# Patient Record
Sex: MALE | NOT HISPANIC OR LATINO | URBAN - METROPOLITAN AREA
[De-identification: names, ages, dates, MRNs, and addresses within clinical notes are randomized per-mention and may not be internally consistent; named-entity substitution may affect disease eponyms.]

---
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
## 2025-06-07 ENCOUNTER — EMERGENCY (EMERGENCY)
Facility: HOSPITAL | Age: 20
LOS: 1 days | End: 2025-06-07
Attending: EMERGENCY MEDICINE | Admitting: EMERGENCY MEDICINE
Payer: COMMERCIAL

## 2025-06-07 VITALS
HEIGHT: 67 IN | RESPIRATION RATE: 18 BRPM | HEART RATE: 83 BPM | OXYGEN SATURATION: 100 % | SYSTOLIC BLOOD PRESSURE: 123 MMHG | TEMPERATURE: 98 F | DIASTOLIC BLOOD PRESSURE: 88 MMHG | WEIGHT: 130.07 LBS

## 2025-06-07 VITALS
HEART RATE: 78 BPM | SYSTOLIC BLOOD PRESSURE: 132 MMHG | OXYGEN SATURATION: 100 % | TEMPERATURE: 98 F | RESPIRATION RATE: 18 BRPM | DIASTOLIC BLOOD PRESSURE: 90 MMHG

## 2025-06-07 PROCEDURE — 99283 EMERGENCY DEPT VISIT LOW MDM: CPT

## 2025-06-07 PROCEDURE — 73562 X-RAY EXAM OF KNEE 3: CPT

## 2025-06-07 PROCEDURE — 73562 X-RAY EXAM OF KNEE 3: CPT | Mod: 26,RT

## 2025-06-07 NOTE — ED PROVIDER NOTE - NSFOLLOWUPINSTRUCTIONS_ED_ALL_ED_FT
Follow-up with orthopedist for reevaluation, ongoing care and treatment.  Rest, weightbearing as tolerated.  Use knee brace for support.  Take over-the-counter Tylenol or Motrin with food as directed for pain.  If having worsening of symptoms or other related symptoms, return to the ER immediately.    Acute Knee Pain, Adult  Many things can cause knee pain. Sometimes, knee pain is sudden (acute). It may be caused by damage, swelling, or irritation of the muscles and tissues that support your knee.    Pain may come from:  A fall.  An injury to the knee from twisting motions.  A hit to the knee.  Infection.  The pain often goes away on its own with time and rest. If the pain does not go away, tests may be done to find out what is causing the pain. These may include:  Imaging tests, such as an X-ray, MRI, CT scan, or ultrasound.  Joint aspiration. In this test, fluid is removed from the knee and checked.  Arthroscopy. In this test, a lighted tube is put in the knee and an image is shown on a screen.  A biopsy. In this test, a health care provider will remove a small piece of tissue for testing.  Follow these instructions at home:  If you have a knee sleeve or brace that can be taken off:    A person's knee with a brace on it.  Wear the knee sleeve or brace as told by your provider. Take it off only if your provider says that you can.  Check the skin around it every day. Tell your provider if you see problems.  Loosen the knee sleeve or brace if your toes tingle, are numb, or turn cold and blue.  Keep the knee sleeve or brace clean and dry.  Bathing    If the knee sleeve or brace is not waterproof:  Do not let it get wet.  Cover it when you take a bath or shower. Use a cover that does not let any water in.  Managing pain, stiffness, and swelling    Bag of ice on a towel on the skin.  If told, put ice on the area.  If you have a knee sleeve or brace that you can take off, remove it as told.  Put ice in a plastic bag.  Place a towel between your skin and the bag.  Leave the ice on for 20 minutes, 2–3 times a day.  If your skin turns bright red, take off the ice right away to prevent skin damage. The risk of damage is higher if you cannot feel pain, heat, or cold.  Move your toes often to reduce stiffness and swelling.  Raise the injured area above the level of your heart while you are sitting or lying down. Use a pillow to support your foot as needed.  If told, use an elastic bandage to put pressure (compression) on your injured knee. This may control swelling, give support, and help with discomfort.  Sleep with a pillow under your knee.  Activity    Rest your knee.  Do not do things that cause pain or make pain worse.  Do not stand or walk on your injured knee until you're told it's okay. Use crutches as told.  Avoid activities where both feet leave the ground at the same time and put stress on the joints. Avoid running, jumping rope, and doing jumping jacks.  Work with a physical therapist to make a safe exercise program if told. Physical therapy helps your knee move better and get stronger. Exercise as told.  General instructions    Take your medicines only as told by your provider.  If you are overweight, work with your provider and an expert in healthy eating, called a dietician, to set goals to lose weight. Being overweight can make your knee hurt more.  Do not smoke, vape, or use products with nicotine or tobacco in them. If you need help quitting, talk with your provider.  Return to normal activities when you are told. Ask what things are safe for you to do.  Watch for any changes in your symptoms.  Keep all follow-up visits. Your provider will check your healing and adjust treatments if needed.  Contact a health care provider if:  The knee pain does not stop.  The knee pain changes or gets worse.  You have a fever along with knee pain.  Your knee is red or feels warm when you touch it.  Your knee gives out or locks up.  Get help right away if:  Your knee swells and the swelling gets worse.  You cannot move your knee.  You have very bad knee pain that does not get better with medicine.

## 2025-06-07 NOTE — ED PROVIDER NOTE - PATIENT PORTAL LINK FT
You can access the FollowMyHealth Patient Portal offered by [facility] by registering at the following website: [email or web address]. By joining [employer]’s FollowMyHealth portal, you will also be able to view your health information using other applications (apps) compatible with our system.

## 2025-06-07 NOTE — ED PROVIDER NOTE - OBJECTIVE STATEMENT
19-year-old male with no past medical history presents with complaint of right knee pain x 1 week.  Patient states that he accidentally walked into a steel pole in his garage over a week ago and hit his medial right knee. States that he was initially fine for a few days and then started feeling pain in his right knee while walking and weight bearing. Denies taking any pain medications at home. Has been using a knee brace. Denies head trauma, fall, numbness, tingling, weakness, open wounds or other symptoms/injuries. Declined pain meds at this time.

## 2025-06-07 NOTE — ED ADULT TRIAGE NOTE - CCCP TRG CHIEF CMPLNT
Quality 47: Advance Care Plan: Advance Care Planning discussed and documented; advance care plan or surrogate decision maker documented in the medical record. Quality 226: Preventive Care And Screening: Tobacco Use: Screening And Cessation Intervention: Patient screened for tobacco use and is an ex/non-smoker Detail Level: Detailed Quality 134: Screening For Clinical Depression And Follow-Up Plan: The patient was screened for depression and the screen was negative and no follow up required Quality 130: Documentation Of Current Medications In The Medical Record: Current Medications Documented Quality 431: Preventive Care And Screening: Unhealthy Alcohol Use - Screening: Patient not identified as an unhealthy alcohol user when screened for unhealthy alcohol use using a systematic screening method pain, knee

## 2025-06-07 NOTE — ED ADULT NURSE NOTE - NS_NURSE_DISC_TEACHING_YN_ED_ALL_ED
Independent ALY Visit. [address] [facility]  Department of Emergency Medicine   ED  Encounter Note  Admit Date/RoomTime: 2023  9:50 PM  ED Room: JUAN MANUEL/JUAN MANUEL    NAME: Sha Card  : 1977  MRN: [id number]     Chief Complaint:  Leg Pain (Right leg pain getting worse starting yesterday. States he broke his right leg 5 years ago and let it heal on its own. Now its causing him pain. )    History of Present Illness       Sha Card is a 51 y.o. old male who presents to the emergency department by private vehicle, for non-traumatic pain to right knee  which occured several year(s) prior to arrival.  The complaint is due to no known cause. Pain has been worse for 2 days. He describes this pain as aching in nature and rates 5 out of 10. Patient states that he broke his right leg 5 years ago and let it heal on its own. Patient stated this caused his pain worse. His pain is aggraveated by any movement and relieved by nothing. His weight bearing ability is: normal. He denies any head injury, headache, loss of consciousness, confusion, dizziness, neck pain, chest pain, abdominal pain, back pain, numbness, weakness, blurred vision, nausea, vomiting, fever, chills, wounds, or rash. Denies any recent trauma, recent surgery, recent injury, recent sick contact. Tetanus Status: unknown. ROS   Pertinent positives and negatives are stated within HPI, all other systems reviewed and are negative. Past Medical History:  has a past medical history of Asthma. Surgical History:  has no past surgical history on file. Social History:  reports that he has been smoking cigarettes. He has been smoking an average of 1 pack per day. He has never been exposed to tobacco smoke. He has never used smokeless tobacco. He reports that he does not currently use alcohol after a past usage of about 4.0 standard drinks per week. He reports that he does not currently use drugs.     Family History: family history is not
No

## 2025-06-07 NOTE — ED PROVIDER NOTE - CLINICAL SUMMARY MEDICAL DECISION MAKING FREE TEXT BOX
Attg note, Dr. Juarez: 19y M reports hitting his right knee on a steel pole at home over a week ago and now for 1 wk has had pain, mostly medial surface with walking, at rest feels ok, no other pain/injury; on exam pt wd, wn, nad; right LE no edema/effusion, no deformity, no abn laxity of knee, no ttp on exam, FROM, skin intact, NVI distally; MDM xr without significant finding, has knee brace he can use for comfort, advise f/u with ortho outpatient for further evaluation

## 2025-06-07 NOTE — ED PROVIDER NOTE - MUSCULOSKELETAL, MLM
R knee non tender with FROM, skin intact, no tenderness with ROM knee, no swelling or erythema noted, no increased warmth noted, R hip/ankle/calf/foot NT with FROM, toes warm & mobile, distal pulses and sensation intact, no foot drop, NVI

## 2025-06-07 NOTE — ED ADULT NURSE NOTE - NSFALLUNIVINTERV_ED_ALL_ED
Bed/Stretcher in lowest position, wheels locked, appropriate side rails in place/Call bell, personal items and telephone in reach/Instruct patient to call for assistance before getting out of bed/chair/stretcher/Non-slip footwear applied when patient is off stretcher/[place] to call system/Physically safe environment - no spills, clutter or unnecessary equipment/Purposeful proactive rounding/Room/bathroom lighting operational, light cord in reach

## 2025-06-07 NOTE — ED ADULT NURSE NOTE - OBJECTIVE STATEMENT
Pt comes in complaining of R knee pain x a couple of days. Pt states he banged his knee on a steel pole in his garage a couple of days ago. Pain subsided but came back. Pt requesting Xray. ROM intact. No swelling noted.

## 2025-06-07 NOTE — ED PROVIDER NOTE - CARE PROVIDER_API CALL
Primo Guerra  Joint Reconstruction  [address] [facility] [address] [address], [address]  [place] [address]  Phone: [phone]  Fax: [phone]  Follow Up Time: 4-6 Days
